# Patient Record
Sex: FEMALE | Race: WHITE | NOT HISPANIC OR LATINO | Employment: UNEMPLOYED | URBAN - METROPOLITAN AREA
[De-identification: names, ages, dates, MRNs, and addresses within clinical notes are randomized per-mention and may not be internally consistent; named-entity substitution may affect disease eponyms.]

---

## 2018-06-14 ENCOUNTER — HISTORICAL (OUTPATIENT)
Dept: ADMINISTRATIVE | Facility: HOSPITAL | Age: 58
End: 2018-06-14

## 2019-07-11 ENCOUNTER — HISTORICAL (OUTPATIENT)
Dept: ADMINISTRATIVE | Facility: HOSPITAL | Age: 59
End: 2019-07-11

## 2020-01-22 ENCOUNTER — HISTORICAL (OUTPATIENT)
Dept: ADMINISTRATIVE | Facility: HOSPITAL | Age: 60
End: 2020-01-22

## 2020-01-25 LAB — FINAL CULTURE: NORMAL

## 2020-02-18 ENCOUNTER — HISTORICAL (OUTPATIENT)
Dept: ADMINISTRATIVE | Facility: HOSPITAL | Age: 60
End: 2020-02-18

## 2020-02-20 LAB — FINAL CULTURE: NORMAL

## 2021-03-31 ENCOUNTER — HISTORICAL (OUTPATIENT)
Dept: ADMINISTRATIVE | Facility: HOSPITAL | Age: 61
End: 2021-03-31

## 2021-08-25 ENCOUNTER — HISTORICAL (OUTPATIENT)
Dept: RADIOLOGY | Facility: HOSPITAL | Age: 61
End: 2021-08-25

## 2023-11-28 DIAGNOSIS — G89.29 CHRONIC RIGHT SHOULDER PAIN: Primary | ICD-10-CM

## 2023-11-28 DIAGNOSIS — M25.511 CHRONIC RIGHT SHOULDER PAIN: Primary | ICD-10-CM

## 2023-12-01 ENCOUNTER — HOSPITAL ENCOUNTER (OUTPATIENT)
Dept: RADIOLOGY | Facility: HOSPITAL | Age: 63
Discharge: HOME | End: 2023-12-01
Attending: STUDENT IN AN ORGANIZED HEALTH CARE EDUCATION/TRAINING PROGRAM
Payer: MEDICAID

## 2023-12-01 ENCOUNTER — OFFICE VISIT (OUTPATIENT)
Dept: ORTHOPEDICS | Facility: CLINIC | Age: 63
End: 2023-12-01
Payer: MEDICAID

## 2023-12-01 VITALS
BODY MASS INDEX: 24.86 KG/M2 | DIASTOLIC BLOOD PRESSURE: 82 MMHG | SYSTOLIC BLOOD PRESSURE: 124 MMHG | HEART RATE: 80 BPM | HEIGHT: 64 IN | TEMPERATURE: 98 F | WEIGHT: 145.63 LBS

## 2023-12-01 DIAGNOSIS — M75.41 SUBACROMIAL IMPINGEMENT OF RIGHT SHOULDER: Primary | ICD-10-CM

## 2023-12-01 DIAGNOSIS — G89.29 CHRONIC RIGHT SHOULDER PAIN: ICD-10-CM

## 2023-12-01 DIAGNOSIS — M25.511 CHRONIC RIGHT SHOULDER PAIN: ICD-10-CM

## 2023-12-01 PROCEDURE — 73030 X-RAY EXAM OF SHOULDER: CPT | Mod: TC,RT

## 2023-12-01 PROCEDURE — 99213 OFFICE O/P EST LOW 20 MIN: CPT | Mod: PBBFAC

## 2023-12-01 RX ORDER — ALBUTEROL SULFATE 90 UG/1
AEROSOL, METERED RESPIRATORY (INHALATION)
COMMUNITY

## 2023-12-01 RX ORDER — ERGOCALCIFEROL 1.25 MG/1
50000 CAPSULE ORAL
COMMUNITY

## 2023-12-01 NOTE — PROGRESS NOTES
"Subjective:    Patient ID: Narda Lopez is a right handed 63 y.o. female  who presented to [facility] for new visit..      Chief Complaint: Pain of the Right Shoulder (Patient is here today for  Right shoulder pain.  It has been going on for  years. States pain started getting worse when she fell twice in Oct of this year. Describes her pain as constant pressure/pushing. Is currently not taking anything for pain at this time.    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 Pain is located at trapezius muscle. Quality of pain is described as pressure.  Non-radiating. Inciting event: While throwing chicken feed she felt a pop in her right shoulder, went to [facility] and was told she may have a fracture of her acromion.  Pain is aggravated by throwing,  . Night pain yes Patient has had prior shoulder problems. Evaluation to date: plain films, PCP evaluation, and ER evaluation. Treatment to date: oral analgesics and home exercises. She has tramadol and is wearing a sling.     Shoulder Review of Systems:  Swelling?  no  Instability?  no  Clicking?  no  Limited ROM? yes  Fever/Chills? no  Subluxation? no  Dislocation? no       Objective:      Physical Exam:    /82   Pulse 80   Temp 97.6 °F (36.4 °C)   Ht 5' 4" (1.626 m)   Wt 66 kg (145 lb 9.6 oz)   BMI 24.99 kg/m²     Appearance:  Soft tissue swelling: Left: no Right: no  Effusion: Left:  Negative Right: Negative  Erythema: Left no Right: no  Ecchymosis: Left: no Right: no  Atrophy: Left: yes Right: yes  Scapular winging: Left: no Right: no    Palpation:  Shoulder Tenderness: Left: none  Right: biceps tendon    Range of motion:  Flexion (0-90): Left:  180 Right: 160  Abduction (0-180): Left:  180 Right: 160  External rotation (0-55): Left: 55 " Right: 55  Internal rotation (0-45): Left: 45 Right: 45    Strength:  Abduction: Left: 5/5 Pain: no Right: 5/5 Pain: yes  External rotation: Left: 5/5 Pain: no Right: 5/5 Pain: no  Internal rotation: Left: 5/5 Pain: no Right: 5/5 Pain: no  Elbow flexion: Left: 5/5 Pain: no Right: 5/5 Pain: no  Elbow extension: Left: 5/5 Pain: no Right: 5/5 Pain: no    Special Tests:  Subacromial Impingement  Neer: Left: Negative Right: Positive  Yoder: Left: Negative Right: Positive    AC Joint Arthritis:  Cross-body abduction: Left: Negative Right: Negative    Rotator Cuff Tear   Drop arm test: Left: Negative Right: Negative  Hornblower: Left: Left: Not performed Right: Not performed   Belly press test: Left: Negative Right: Negative  Jennifer test (Empty can): Left: Negative Right: Positive    Biceps tendon/Labral tear  Speed's: Left: Negative Right: Negative  Yergason's: Left: Negative Right: Negative  O'Onesimo's: Left: Negative Right: Negative  Cranck test: Left: Negative Right: Negative    Stability   Sulcus sign: Left: Not performed Right: Not performed   Apprehension test: Left: Not performed Right: Not performed   Relocation test: Left: Not performed Right: Not performed     Cervical   Spurling: Left: Negative Right: Negative    AIN/PIN/Ulnar nerve: Intact and symmetric    General appearance: NAD  Peripheral pulses: normal bilaterally   Reflexes: Left: Not performed Right: Not performed   Sensation: normal    Labs:  Last A1c: The patient doesn't have any registry metric data available     Imaging:   Previous images reviewed.  X-rays ordered and performed today: yes  # of views: 4 Laterality: right  My Interpretation:  High-riding humeral head significant for acromial impingement.  Previously seen enchondroma versus bone infarct of the humeral head, stable compared to previous imaging.    Assessment:        Encounter Diagnoses   Code Name Primary?    M75.41 Subacromial impingement of right shoulder Yes        Plan:            Orders Placed This Encounter   Procedures    X-ray Shoulder 2 or More Views Right     Standing Status:   Future     Number of Occurrences:   1     Standing Expiration Date:   12/1/2024     Order Specific Question:   May the Radiologist modify the order per protocol to meet the clinical needs of the patient?     Answer:   Yes     Order Specific Question:   Release to patient     Answer:   Immediate          MDM: Prior external referring provider notes reviewed. Prior external referring provider studies reviewed.   Dx:  Right subacromial impingement syndrome.  New problem in mild exacerbation  Treatment Plan: Discussed with patient diagnosis and treatment recommendations.   Recommend conservative management.    Recommend physical therapy.    Recommend removing sling and encouraging range-of-motion exercises.  Recommend follow up in 6 weeks, we will consider subacromial injection if no improvement with physical therapy.    Recommend repeat x-ray in 3 months right humeral head.  Greater than 30 minutes spent in direct patient care.  Imaging: radiological studies ordered and independently reviewed; discussed with patient; pending radiologist interpretation.   Procedure:  Patient declining CSI preferring PT at the moment.  Therapy: Physical Therapy formal script provided day.  Medication: START over-the-counter acetaminophen (Tylenol 1000 mg three times per day as needed)  CONTINUE previously prescribed medication see list. . Please see your primary care physician for further refills.  RTC:  6 weeks.         Scotty Sofia MD.  Note dictated using MModal.

## 2025-01-30 NOTE — PROGRESS NOTES
Faculty Attestation: Narda Lopez  was seen in [facility]. Discussed with Dr. Sofia at the time of the visit. History of Present Illness, Physical Exam, and Assessment and Plan reviewed. Treatment plan is reasonable and appropriate. Compliance with treatment recommendations is important.  Radiology images independently reviewed and agree with fellow interpretation.  No procedure was performed.     Katie Cota MD  Sports Medicine       Please notify patient of result(s) below. Thank you!  No abnormal lung nodules, consolidations, or lymph node enlargement. No mass or other cause of venous compression is visualized